# Patient Record
(demographics unavailable — no encounter records)

---

## 2025-07-20 NOTE — ADDENDUM
[FreeTextEntry1] : Entered by Fatuma Pierre, acting as scribe for Dr Sam Ruth. The documentation recorded by the scribe accurately reflects the service I personally performed and the decisions made by me.

## 2025-07-20 NOTE — HISTORY OF PRESENT ILLNESS
[FreeTextEntry1] : New elevated PSA, PSA 19 in June 2025. History of back surgery with metal implants.  New intermittent gross hematuria for 2 months, reports pinkish color urine, denied dysuria, WBC 21-50 in UA  Creatinine 1.03, GFR 76 in June 2025  Please refer to URO Consult note

## 2025-07-20 NOTE — LETTER BODY
[FreeTextEntry1] : Prem Johnston MD   [address] [place] [address]   [phone]   Dear Dr Johnston,   Reason for Visit: Elevated PSA. Pyuria.     This is a 74 year-old male with elevated PSA. Patient reports that he has not had previous prostate biopsy. His current PSA is 19. Patient denies any hematuria or lower urinary tract symptoms. He denies any pain. The patient has a past surgical history significant for back surgery with metal implants due to lumbar spine compression. The patient reports intermittent gross hematuria for the last 2 months. He reports pinkish color urine and denies any dysuria. He was found to have 21-50 WBC/HPF on urinalysis. Creatinine was 1.03 and GFR was 76 in June 2025. All other review of systems are negative. He has no cancer in his family medical history. He has no other previous surgical history. Past medical history, family history and social history were inquired and were noncontributory to current condition. The patient does not use tobacco or drink alcohol. Medications and allergies were reviewed. He has no known allergies to medication.   On examination, the patient is a well-appearing male in no acute distress. He is alert and oriented follows commands. He has normal mood and affect. He is normocephalic. Neck is supple. Oral no thrush. Respirations are unlabored. His abdomen is soft and nontender. Bladder is nonpalpable. No CVA tenderness. Neurologically he is grossly intact. No peripheral edema. Skin without gross abnormality. The patient's urinalysis demonstrated pyruia, 21-50 WBC/HPF.      Assessment: Elevated PSA. Pyuria.      I counseled the patient. I discussed with him the risk of occult malignancy. I recommended he obtain a prostate MRI for fusion targeted prostate biopsy. Risks and alternatives were discussed. I answered the patient questions. He will take the necessary preparations for the procedure, including fleet enema. He will repeat PSA and BMP today to establish baselines. In terms of his pyuria, I recommended the patient obtain urinalysis, urine culture, and urine cytology to evaluate for infections and high grade urothelial carcinoma. The patient will follow-up as directed and will contact me with any questions or concerns. Thank you for the opportunity to participate in the care of Mr. WOLF. I will keep you updated on his progress.  Patient will continue longitudinal care for their complex and chronic condition.   Plan: Prostate MRI.  Fusion biopsy.  PSA. BMP. Urinalysis. Urine culture. Urine cytology. Follow up as directed.

## 2025-07-20 NOTE — LETTER BODY
[FreeTextEntry1] : Prem Johnston MD   [address] [place] [address]   [phone]   Dear Dr Johnston,   Reason for Visit: Elevated PSA. Pyuria.     This is a 74 year-old male with elevated PSA. Patient reports that he has not had previous prostate biopsy. His current PSA is 19. Patient denies any hematuria or lower urinary tract symptoms. He denies any pain. The patient has a past surgical history significant for back surgery with metal implants due to lumbar spine compression. The patient reports intermittent gross hematuria for the last 2 months. He reports pinkish color urine and denies any dysuria. He was found to have 21-50 WBC/HPF on urinalysis. Creatinine was 1.03 and GFR was 76 in June 2025. All other review of systems are negative. He has no cancer in his family medical history. He has no other previous surgical history. Past medical history, family history and social history were inquired and were noncontributory to current condition. The patient does not use tobacco or drink alcohol. Medications and allergies were reviewed. He has no known allergies to medication.   On examination, the patient is a well-appearing male in no acute distress. He is alert and oriented follows commands. He has normal mood and affect. He is normocephalic. Neck is supple. Oral no thrush. Respirations are unlabored. His abdomen is soft and nontender. Bladder is nonpalpable. No CVA tenderness. Neurologically he is grossly intact. No peripheral edema. Skin without gross abnormality. The patient's urinalysis demonstrated pyruia, 21-50 WBC/HPF.      Assessment: Elevated PSA. Pyuria.      I counseled the patient. I discussed with him the risk of occult malignancy. I recommended he obtain a prostate MRI for fusion targeted prostate biopsy. Risks and alternatives were discussed. I answered the patient questions. He will take the necessary preparations for the procedure, including fleet enema. He will repeat PSA and BMP today to establish baselines. In terms of his pyuria, I recommended the patient obtain urinalysis, urine culture, and urine cytology to evaluate for infections and high grade urothelial carcinoma. The patient will follow-up as directed and will contact me with any questions or concerns. Thank you for the opportunity to participate in the care of Mr. WOLF. I will keep you updated on his progress.  Patient will continue longitudinal care for their complex and chronic condition.   Plan: Prostate MRI.  Fusion biopsy.  PSA. BMP. Urinalysis. Urine culture. Urine cytology. Follow up as directed.